# Patient Record
Sex: FEMALE | HISPANIC OR LATINO | Employment: FULL TIME | URBAN - METROPOLITAN AREA
[De-identification: names, ages, dates, MRNs, and addresses within clinical notes are randomized per-mention and may not be internally consistent; named-entity substitution may affect disease eponyms.]

---

## 2020-04-21 ENCOUNTER — TELEMEDICINE (OUTPATIENT)
Dept: FAMILY MEDICINE CLINIC | Facility: CLINIC | Age: 45
End: 2020-04-21

## 2020-04-21 DIAGNOSIS — H66.90 ACUTE OTITIS MEDIA, UNSPECIFIED OTITIS MEDIA TYPE: Primary | ICD-10-CM

## 2020-04-21 PROCEDURE — 99213 OFFICE O/P EST LOW 20 MIN: CPT | Performed by: NURSE PRACTITIONER

## 2020-04-21 RX ORDER — CEFDINIR 300 MG/1
300 CAPSULE ORAL 2 TIMES DAILY
Qty: 20 CAPSULE | Refills: 0 | Status: SHIPPED | OUTPATIENT
Start: 2020-04-21 | End: 2020-05-01

## 2020-04-21 NOTE — PROGRESS NOTES
Subjective   Ariana Myers is a 44 y.o. female.     Chief Complaint   Patient presents with   • Earache     You have chosen to receive care through a telehealth visit.  Do you consent to use a video/audio connection for your medical care today? Yes  Earache    There is pain in both ears. This is a new problem. The current episode started in the past 7 days. Maximum temperature: feverish but no temperature check  The pain is at a severity of 6/10. Pertinent negatives include no coughing, ear discharge, hearing loss, rhinorrhea or sore throat. Associated symptoms comments: Myalgia . She has tried acetaminophen and ear drops for the symptoms. The treatment provided mild relief. Her past medical history is significant for a chronic ear infection.          The following portions of the patient's history were reviewed and updated as appropriate: allergies, current medications, past family history, past medical history, past social history, past surgical history and problem list.    History reviewed. No pertinent past medical history.    History reviewed. No pertinent surgical history.    History reviewed. No pertinent family history.    Social History     Socioeconomic History   • Marital status: Single     Spouse name: Not on file   • Number of children: Not on file   • Years of education: Not on file   • Highest education level: Not on file       Review of Systems   HENT: Positive for ear pain. Negative for ear discharge, hearing loss, rhinorrhea and sore throat.    Respiratory: Negative for cough.        Objective   There were no vitals filed for this visit.   There is no height or weight on file to calculate BMI.  Physical Exam   Constitutional: She is oriented to person, place, and time. She appears well-developed and well-nourished.   Pulmonary/Chest: Effort normal.   Neurological: She is alert and oriented to person, place, and time.   Psychiatric: She has a normal mood and affect.         Assessment/Plan    Ariana was seen today for earache.    Diagnoses and all orders for this visit:    Acute otitis media, unspecified otitis media type  -     cefdinir (OMNICEF) 300 MG capsule; Take 1 capsule by mouth 2 (Two) Times a Day for 10 days.

## 2020-04-23 ENCOUNTER — TELEPHONE (OUTPATIENT)
Dept: FAMILY MEDICINE CLINIC | Facility: CLINIC | Age: 45
End: 2020-04-23

## 2020-04-23 NOTE — TELEPHONE ENCOUNTER
PT WAS PRESCRIBED  cefdinir (OMNICEF) 300 MG capsule     FOR EAR INFECTION  AND PT STATES THAT SHE HAS BEEN EXPERIENCING INSOMNIA AND SEVERE NAUSEA AND VOMITING AND HER SKIN FELT ITCHY. PT WANTS TO KNOW IF SOMETHING ELSE CAN BE PRESCRIBED. PATIENT WANTS TO KNOW IF THERES ANY WAY SHE CAN BE SEEN IN THE OFFICE BECAUSE SHE DOESN'T FEEL COMFORTABLE.     PAID $90 OUT OF POCKET AND WANTS TO KNOW IF THERES ANY POSSIBILITY OF REIMBURSEMENT.     [facility]    PT CALL BACK [phone]

## 2020-04-23 NOTE — TELEPHONE ENCOUNTER
Could you speak with this patient? She wishes to be seen in office for earache. However, she has been feeling feverish without actually checking her temperature. I would recommend she go to urgent care, but she is now requesting reimbursement for our telemedicine visit since the antibiotic is causing possible side effects.